# Patient Record
Sex: MALE | Race: WHITE
[De-identification: names, ages, dates, MRNs, and addresses within clinical notes are randomized per-mention and may not be internally consistent; named-entity substitution may affect disease eponyms.]

---

## 2018-04-03 ENCOUNTER — HOSPITAL ENCOUNTER (OUTPATIENT)
Dept: HOSPITAL 42 - ED | Age: 43
Setting detail: OBSERVATION
LOS: 1 days | Discharge: HOME | End: 2018-04-04
Attending: INTERNAL MEDICINE | Admitting: INTERNAL MEDICINE
Payer: COMMERCIAL

## 2018-04-03 VITALS — RESPIRATION RATE: 20 BRPM

## 2018-04-03 DIAGNOSIS — R07.89: Primary | ICD-10-CM

## 2018-04-03 DIAGNOSIS — E78.00: ICD-10-CM

## 2018-04-03 DIAGNOSIS — F17.210: ICD-10-CM

## 2018-04-03 DIAGNOSIS — E11.9: ICD-10-CM

## 2018-04-03 LAB
ALBUMIN SERPL-MCNC: 4.7 G/DL (ref 3–4.8)
ALBUMIN/GLOB SERPL: 1.4 {RATIO} (ref 1.1–1.8)
ALT SERPL-CCNC: 79 U/L (ref 7–56)
APTT BLD: 28.2 SECONDS (ref 25.1–36.5)
AST SERPL-CCNC: 55 U/L (ref 17–59)
BASOPHILS # BLD AUTO: 0.01 K/MM3 (ref 0–2)
BASOPHILS NFR BLD: 0.2 % (ref 0–3)
BNP SERPL-MCNC: < 11.1 PG/ML (ref 0–450)
BUN SERPL-MCNC: 11 MG/DL (ref 7–21)
CALCIUM SERPL-MCNC: 10.1 MG/DL (ref 8.4–10.5)
D DIMER PPP FEU-MCNC: < 200 NG/ML (ref 0–243)
EOSINOPHIL # BLD: 0.1 10*3/UL (ref 0–0.7)
EOSINOPHIL NFR BLD: 2.1 % (ref 1.5–5)
ERYTHROCYTE [DISTWIDTH] IN BLOOD BY AUTOMATED COUNT: 13.4 % (ref 11.5–14.5)
GFR NON-AFRICAN AMERICAN: > 60
GRANULOCYTES # BLD: 1.95 10*3/UL (ref 1.4–6.5)
GRANULOCYTES NFR BLD: 41.5 % (ref 50–68)
HDLC SERPL-MCNC: 39 MG/DL (ref 29–60)
HGB BLD-MCNC: 15.7 G/DL (ref 14–18)
INR PPP: 1.05 (ref 0.93–1.08)
LDLC SERPL-MCNC: 160 MG/DL (ref 0–129)
LYMPHOCYTES # BLD: 2.3 10*3/UL (ref 1.2–3.4)
LYMPHOCYTES NFR BLD AUTO: 49 % (ref 22–35)
MCH RBC QN AUTO: 29.5 PG (ref 25–35)
MCHC RBC AUTO-ENTMCNC: 34.2 G/DL (ref 31–37)
MCV RBC AUTO: 86.1 FL (ref 80–105)
MONOCYTES # BLD AUTO: 0.3 10*3/UL (ref 0.1–0.6)
MONOCYTES NFR BLD: 7.2 % (ref 1–6)
PLATELET # BLD: 217 10^3/UL (ref 120–450)
PMV BLD AUTO: 10 FL (ref 7–11)
PROTHROMBIN TIME: 12 SECONDS (ref 9.4–12.5)
RBC # BLD AUTO: 5.33 10^6/UL (ref 3.5–6.1)
TROPONIN I SERPL-MCNC: < 0.01 NG/ML
WBC # BLD AUTO: 4.7 10^3/UL (ref 4.5–11)

## 2018-04-03 PROCEDURE — 82550 ASSAY OF CK (CPK): CPT

## 2018-04-03 PROCEDURE — 93970 EXTREMITY STUDY: CPT

## 2018-04-03 PROCEDURE — 85730 THROMBOPLASTIN TIME PARTIAL: CPT

## 2018-04-03 PROCEDURE — 80061 LIPID PANEL: CPT

## 2018-04-03 PROCEDURE — 84443 ASSAY THYROID STIM HORMONE: CPT

## 2018-04-03 PROCEDURE — 82948 REAGENT STRIP/BLOOD GLUCOSE: CPT

## 2018-04-03 PROCEDURE — 93306 TTE W/DOPPLER COMPLETE: CPT

## 2018-04-03 PROCEDURE — 80053 COMPREHEN METABOLIC PANEL: CPT

## 2018-04-03 PROCEDURE — 85378 FIBRIN DEGRADE SEMIQUANT: CPT

## 2018-04-03 PROCEDURE — 83615 LACTATE (LD) (LDH) ENZYME: CPT

## 2018-04-03 PROCEDURE — 85610 PROTHROMBIN TIME: CPT

## 2018-04-03 PROCEDURE — 83880 ASSAY OF NATRIURETIC PEPTIDE: CPT

## 2018-04-03 PROCEDURE — 36415 COLL VENOUS BLD VENIPUNCTURE: CPT

## 2018-04-03 PROCEDURE — 99285 EMERGENCY DEPT VISIT HI MDM: CPT

## 2018-04-03 PROCEDURE — 85025 COMPLETE CBC W/AUTO DIFF WBC: CPT

## 2018-04-03 PROCEDURE — 83036 HEMOGLOBIN GLYCOSYLATED A1C: CPT

## 2018-04-03 PROCEDURE — 71045 X-RAY EXAM CHEST 1 VIEW: CPT

## 2018-04-03 PROCEDURE — 84484 ASSAY OF TROPONIN QUANT: CPT

## 2018-04-03 PROCEDURE — 93005 ELECTROCARDIOGRAM TRACING: CPT

## 2018-04-03 RX ADMIN — INSULIN HUMAN SCH: 100 INJECTION, SOLUTION PARENTERAL at 22:11

## 2018-04-03 RX ADMIN — INSULIN HUMAN SCH: 100 INJECTION, SOLUTION PARENTERAL at 16:42

## 2018-04-03 NOTE — CARD
--------------- APPROVED REPORT --------------





EKG Measurement

Heart [id number]

WY 170P30

[id number]

[id number]

[id number]



<Conclusion>

Normal sinus rhythm

Normal ECG

## 2018-04-03 NOTE — CP.PCM.HP
<Pedro Hurd - Last Filed: 04/03/18 15:24>





History of Present Illness





- History of Present Illness


History of Present Illness: 


Medicine H/P: Dr. Feliciano





Chief Complaint: Chest pain





HPI:


42 year old male with past medical history of diabetes and tobacco abuse 

presents with one month duration of chest pain.  Patient states that the pain 

occurs on and off and certain positions do make it worse.  Patient denies any 

shortness of breath with the pain, but states that he does get short of breath 

from time to time and is not able to walk long distances.  Patient came into 

the ER today because he experienced diaphoresis with the pain and said "i felt 

like i was turning yellow."  





Patient denies any previous cardiac work up, including ECHO, Stress, and Cath.  

Patient further denies any nausea, vomiting, diarrhea, and any chest pain in 

the past.  Patient denies recent illness, fevers, and chills.





PMD:       Dr. Paredes


Surgical History:    Patient denies


Medical History:    Diabetes, Back pain


Allergies:       Patient denies


Social History:    Smokes 1 cigarette a day, denies alcohol and illicits, 

reliable historian


Family History:    Denies any cardiac history; Diabetes


Medications:       Metformin 500 daily; Ibuprofen 800 for back pain


Review of Systems:   12 point ROS obtained and negative except as per HPI





Present on Admission





- Present on Admission


Any Indicators Present on Admission: No





Past Patient History





- Past Social History


Smoking Status: Current Some Days Smoker





- CARDIAC


Hx Cardiac Disorders: No





- PULMONARY


Hx Respiratory Disorders: No





- NEUROLOGICAL


Hx Neurological Disorder: No





- HEENT


Hx HEENT Problems: No





- RENAL


Hx Chronic Kidney Disease: No





- ENDOCRINE/METABOLIC


Hx Endocrine Disorders: Yes


Hx Diabetes Mellitus Type 2: Yes





- HEMATOLOGICAL/ONCOLOGICAL


Hx Blood Disorders: No





- INTEGUMENTARY


Hx Dermatological Problems: No





- MUSCULOSKELETAL/RHEUMATOLOGICAL


Hx Musculoskeletal Disorders: No





- GASTROINTESTINAL


Hx Gastrointestinal Disorders: No





- GENITOURINARY/GYNECOLOGICAL


Hx Genitourinary Disorders: No





- PSYCHIATRIC


Hx Psychophysiologic Disorder: No


Hx Substance Use: No





- SURGICAL HISTORY


Hx Surgeries: No





Meds


Allergies/Adverse Reactions: 


 Allergies











Allergy/AdvReac Type Severity Reaction Status Date / Time


 


No Known Allergies Allergy   Verified 04/03/18 12:00














Physical Exam





- Constitutional


Appears: Well





- Head Exam


Head Exam: ATRAUMATIC, NORMAL INSPECTION, NORMOCEPHALIC





- Eye Exam


Eye Exam: EOMI, Normal appearance, PERRL


Pupil Exam: NORMAL ACCOMODATION, PERRL





- ENT Exam


ENT Exam: Mucous Membranes Moist, Normal Exam





- Neck Exam


Neck exam: Positive for: Normal Inspection





- Respiratory Exam


Respiratory Exam: Clear to Auscultation Bilateral, NORMAL BREATHING PATTERN





- Cardiovascular Exam


Cardiovascular Exam: REGULAR RHYTHM


Additional comments: 


Tenderness to palpation in Left chest wall





- GI/Abdominal Exam


GI & Abdominal Exam: Normal Bowel Sounds, Soft.  absent: Tenderness





- Extremities Exam


Extremities exam: Positive for: normal inspection





- Back Exam


Back exam: NORMAL INSPECTION





- Neurological Exam


Neurological exam: Alert, CN II-XII Intact, Normal Gait, Oriented x3, Reflexes 

Normal





- Psychiatric Exam


Psychiatric exam: Normal Affect, Normal Mood





- Skin


Skin Exam: Dry, Intact, Normal Color, Warm





Results





- Vital Signs


Recent Vital Signs: 





 Last Vital Signs











Temp  98.8 F   04/03/18 12:01


 


Pulse  71   04/03/18 15:21


 


Resp  18   04/03/18 15:21


 


BP  132/79   04/03/18 15:21


 


Pulse Ox  98   04/03/18 15:21














- Labs


Result Diagrams: 


 04/03/18 13:00





 04/03/18 13:00





Assessment & Plan





- Assessment and Plan (Free Text)


Assessment: 


Assessment and Plan





42 year old male with past medical history of diabetes and back pain presenting 

with one month duration of left sided typical chest pain, not associated with 

any symptoms.  ASCVD 16.2% 10 year.





Chest pain rule out ACS, likely musculoskeletal


- Tenderness to palpation on left chest wall; EKG showed NSR with no ST/T 

changes; Chest XR negative


- Tropes X 3, ECHO, Coags pending, Trope X 1 negative 


- Dr. Paredes on consult


- Lipid panel showed elevated cholesterol and LDL


- Lipitor 20





Diabetes


- TSH, A1C ordered


- RISS Low





Calf tenderness


- Duplex, D-dimer 





Elevated ALT


- No clinical signs of transaminitis


- Monitor





GI/DVT Prophylaxis


- Heparin/Protonix





<Rangasamy,Ajantha - Last Filed: 04/03/18 19:07>





Results





- Vital Signs


Recent Vital Signs: 





 Last Vital Signs











Temp  98.3 F   04/03/18 18:00


 


Pulse  74   04/03/18 18:00


 


Resp  20   04/03/18 18:00


 


BP  98/67 L  04/03/18 18:00


 


Pulse Ox  99   04/03/18 18:00














- Labs


Result Diagrams: 


 04/03/18 13:00





 04/03/18 13:00





Attending/Attestation





- Attestation


I have personally seen and examined this patient.: Yes


I have fully participated in the care of the patient.: Yes


I have reviewed all pertinent clinical information: Yes


Notes (Text): 





04/03/18 19:05





attending note;





Patient seen and examined with resident.





Patient is a 42 year old male with past medical history of diabetes and tobacco 

abuse  is admitted with chest pain.


patient had nonspecific chest pain on and off.


EKG showed normal sinus rhythm with no acute ST-T changes.


Troponin 1 negative.


Currently complaining of exertional dyspnea.


Complaining of lower extremity discomfort. Doppler is negative.





diabetes; continue regular Insulin sliding scale.





active smoking; Smoking cessation is strongly advised.





Admit to telemetry. Cardiac enzymes 3 ordered. Echocardiogram requested.


Cardiology evaluation requested.





Upon discharge the patient will follow-up with PMD Dr. Paredes.

## 2018-04-03 NOTE — US
HISTORY:

Leg pain and swelling. Evaluate for DVT



PHYSICIAN(S):  Wellington Murdock MD.



TECHNIQUE:

Duplex sonography and color-flow Doppler with graded compression were 

used to evaluate the deep venous systems of both lower extremities. 



FINDINGS:

The visualized deep venous systems of both lower extremities are 

sonographically normal and compressible. Normal wave forms and 

augmentation are seen. There is no sonographic evidence for deep 

venous thrombosis in the visualized segments of both lower 

extremities.



IMPRESSION:

No sonographic evidence for deep venous thrombosis in the visualized 

segments of both lower extremities.

## 2018-04-03 NOTE — RAD
HISTORY:

cp  



COMPARISON:

No prior. 



FINDINGS:



LUNGS:

No active pulmonary disease.



PLEURA:

No significant pleural effusion identified, no pneumothorax apparent.



CARDIOVASCULAR:

Normal.



OSSEOUS STRUCTURES:

No significant abnormalities.



VISUALIZED UPPER ABDOMEN:

Normal.



OTHER FINDINGS:

None.



IMPRESSION:

No acute cardiopulmonary disease appreciated.

## 2018-04-03 NOTE — ED PDOC
Arrival/HPI





- General


Chief Complaint: Weakness/Neurological Deficit


Time Seen by Provider: 04/03/18 12:11


Historian: Patient





- History of Present Illness


Narrative History of Present Illness (Text): 


04/03/18 12:30


A 42 year old male, whose past medical history includes diabetes, presents to 

the emergency department complaining of intermittent chest pain for the past 

couple months. Patient reports chest pain with dizziness and diaphoresis over 

the last couple days. Patient is a smoker, denies drinking alcohol. Patient 

denies any shortness of breath, nausea, vomiting or any other complaints at 

this time.





Time/Duration: < week, > month


Symptom Onset: Sudden


Symptom Course: Unchanged


Activities at Onset: Rest


Context: Home


Associated Symptoms (Text): 





04/03/18 12:35


Intermittent left-sided chest pain for the last several months. No radiation. 

Over the last few days he's also had dizziness and diaphoresis associated with 

the chest pain. He is a smoker and diabetic.





Past Medical History





- Provider Review


Nursing Documentation Reviewed: Yes





- Cardiac


Hx Cardiac Disorders: No





- Pulmonary


Hx Respiratory Disorders: No





- Neurological


Hx Neurological Disorder: No





- HEENT


Hx HEENT Disorder: No





- Renal


Hx Renal Disorder: No





- Endocrine/Metabolic


Hx Endocrine Disorders: Yes


Hx Diabetes Mellitus Type 2: Yes





- Hematological/Oncological


Hx Blood Disorders: No





- Integumentary


Hx Dermatological Disorder: No





- Musculoskeletal/Rheumatological


Hx Musculoskeletal Disorders: No





- Gastrointestinal


Hx Gastrointestinal Disorders: No





- Genitourinary/Gynecological


Hx Genitourinary Disorders: No





- Psychiatric


Hx Psychophysiologic Disorder: No


Hx Substance Use: No





Family/Social History





- Physician Review


Nursing Documentation Reviewed: Yes


Family/Social History: No Known Family HX


Smoking Status: Current Some Days Smoker


Hx Alcohol Use: No


Hx Substance Use: No





Allergies/Home Meds


Allergies/Adverse Reactions: 


Allergies





No Known Allergies Allergy (Verified 04/03/18 12:00)


 








Home Medications: 


 Home Meds











 Medication  Instructions  Recorded  Confirmed


 


metFORMIN [glucOPHAGE] 500 mg PO DAILY 04/03/18 04/03/18














Review of Systems





- Physician Review


All systems were reviewed & negative as marked: Yes





- Review of Systems


Constitutional: absent: Fatigue, Fevers


Respiratory: absent: SOB


Cardiovascular: Chest Pain.  absent: Palpitations, Syncope


Gastrointestinal: absent: Abdominal Pain, Nausea, Vomiting


Neurological: Dizziness.  absent: Headache, Focal Weakness


Endocrine: Diaphoresis





Physical Exam


Vital Signs Reviewed: Yes


Vital Signs











  Temp Pulse Resp BP Pulse Ox


 


 04/03/18 12:23   76  20  137/86  98


 


 04/03/18 12:01  98.8 F  72  16  139/82  95











Temperature: Afebrile


Blood Pressure: Normal


Pulse: Regular


Respiratory Rate: Normal


Appearance: Positive for: Well-Appearing, Non-Toxic, Comfortable


Pain Distress: None


Mental Status: Positive for: Alert and Oriented X 3





- Systems Exam


Head: Present: Atraumatic, Normocephalic


Pupils: Present: PERRL


Extroacular Muscles: Present: EOMI


Conjunctiva: Present: Normal


Mouth: Present: Moist Mucous Membranes


Pharnyx: No: ERYTHEMA, EXUDATE, TONSILS ENLARGED


Neck: Present: Normal Range of Motion


Respiratory/Chest: Present: Clear to Auscultation, Good Air Exchange.  No: 

Respiratory Distress, Accessory Muscle Use, Tender to Palpation


Cardiovascular: Present: Regular Rate and Rhythm, Normal S1, S2.  No: Murmurs


Abdomen: No: Tenderness, Distention, Peritoneal Signs


Back: Present: Normal Inspection


Upper Extremity: Present: Normal Inspection.  No: Cyanosis, Edema


Lower Extremity: Present: Normal Inspection.  No: Edema


Neurological: Present: GCS=15, CN II-XII Intact, Speech Normal, Motor Func 

Grossly Intact


Skin: Present: Warm, Dry, Normal Color.  No: Rashes


Psychiatric: Present: Alert, Oriented x 3, Normal Insight, Normal Concentration





Medical Decision Making


ED Course and Treatment: 


04/03/18 12:29


Impression:


A 42 year old male with chest pain, dizziness and diaphoresis.





Plan:


-- EKG


-- Chest X-ray


-- labs


-- Aspirin


-- Reassess and disposition





Progress Notes:





04/03/18 12:36


EKG shows normal sinus rhythm rate approximately 72 with no acute ST or T-wave 

changes





04/03/18 13:14


Chest X-ray


Creator : Kameron Branham MD 


IMPRESSION:


No acute cardiopulmonary disease appreciated.








- Lab Interpretations


Lab Results: 








 04/03/18 13:00 





 04/03/18 13:00 





 Lab Results





04/03/18 13:00: Sodium 146, Potassium 3.7, Chloride 104, Carbon Dioxide 28, 

Anion Gap 17, BUN 11, Creatinine 0.9, Est GFR (African Amer) > 60, Est GFR (Non-

Af Amer) > 60, Random Glucose 97, Calcium 10.1, Total Bilirubin 1.0, AST 55, 

ALT 79 H, Alkaline Phosphatase 68, Lactate Dehydrogenase 564, Total Creatine 

Kinase 224, Troponin I < 0.01, NT-Pro-B Natriuret Pep < 11.1, Total Protein 8.2

, Albumin 4.7, Globulin 3.4, Albumin/Globulin Ratio 1.4


04/03/18 13:00: WBC 4.7, RBC 5.33, Hgb 15.7, Hct 45.9, MCV 86.1, MCH 29.5, MCHC 

34.2, RDW 13.4, Plt Count 217, MPV 10.0, Gran % 41.5 L, Lymph % (Auto) 49.0 H, 

Mono % (Auto) 7.2 H, Eos % (Auto) 2.1, Baso % (Auto) 0.2, Gran # 1.95, Lymph # (

Auto) 2.3, Mono # (Auto) 0.3, Eos # (Auto) 0.1, Baso # (Auto) 0.01








I have reviewed the lab results: Yes





- RAD Interpretation


Radiology Orders: 








04/03/18 12:23


CHEST PORTABLE [RAD] Stat 














- EKG Interpretation


Interpreted by ED Physician: Yes


Type: 12 lead EKG





- Medication Orders


Current Medication Orders: 











Discontinued Medications





Aspirin (Ecotrin)  325 mg PO STAT STA


   Stop: 04/03/18 12:23


   Last Admin: 04/03/18 12:57  Dose: 325 mg











- Scribe Statement


The provider has reviewed the documentation as recorded by the Jennifer Villalobos





Provider Scribe Attestation:


All medical record entries made by the Jennifer were at my direction and 

personally dictated by me. I have reviewed the chart and agree that the record 

accurately reflects my personal performance of the history, physical exam, 

medical decision making, and the department course for this patient. I have 

also personally directed, reviewed, and agree with the discharge instructions 

and disposition.











Disposition/Present on Arrival





- Present on Arrival


Any Indicators Present on Arrival: No


History of DVT/PE: No


History of Uncontrolled Diabetes: No


Urinary Catheter: No


History of Decub. Ulcer: No


History Surgical Site Infection Following: None





- Disposition


Have Diagnosis and Disposition been Completed?: Yes


Diagnosis: 


 Chest pain





Disposition: HOSPITALIZED


Disposition Time: 13:39


Patient Plan: Observation, Telemetry


Condition: GOOD


Discharge Instructions (ExitCare):  Chest Pain (ED)


Referrals: 


Cris Paredes MD [Primary Care Provider] - Follow up with primary


Forms:  View2Gether (English)

## 2018-04-04 VITALS — SYSTOLIC BLOOD PRESSURE: 111 MMHG | DIASTOLIC BLOOD PRESSURE: 75 MMHG | TEMPERATURE: 98.3 F

## 2018-04-04 VITALS — HEART RATE: 93 BPM

## 2018-04-04 VITALS — OXYGEN SATURATION: 100 %

## 2018-04-04 RX ADMIN — INSULIN HUMAN SCH: 100 INJECTION, SOLUTION PARENTERAL at 07:38

## 2018-04-04 RX ADMIN — INSULIN HUMAN SCH: 100 INJECTION, SOLUTION PARENTERAL at 12:36

## 2018-04-04 NOTE — CON
DATE:



CARDIOLOGY CONSULT



HISTORY OF PRESENT ILLNESS:  The patient is 42-year-old [place] man who

was diagnosed with diabetes mellitus one year ago, has no known prior

cardiac history, who presents because of chest pain that he describes as

pinching in the chest after he had an argument.  The patient is unaware of

any prior cardiac history.  He denies any chest pain at this time.  He

denies any associated shortness of breath or diaphoresis with his initial

presentation.



SOCIAL HISTORY:  The patient is occasional smoker, he works as a taxi

.



MEDICATIONS:  Current medications are subcutaneous heparin 5000 units every

8 hours, Lipitor 20 mg once a day, Protonix 40 mg once day, and metformin

500 mg orally daily at home.



REVIEW OF SYSTEMS:  No fever or chills, no vomiting, no diarrhea.



PHYSICAL EXAMINATION:

GENERAL:  The patient is middle-aged male who does not appear to be in any

distress.

VITAL SIGNS:  Blood pressure 143/71, heart rate 71, temperature 99,

respirations 20.

HEENT:  Normocephalic.

NECK:  No JVD.

CHEST:  Clear.

HEART:  S1 and S2, regular.

ABDOMEN:  Soft.

EXTREMITIES:  No edema.



LABORATORY DATA:  Three sets of troponin's are negative.  SMA-7 is within

normal limits.  Total cholesterol is 238 and LDL cholesterol is 160, both

are significantly elevated.  PT, PTT, D-dimer are within normal limits. 

Hemoglobin, hematocrit, white count, and platelet count are within normal

limits.  An EKG revealed normal sinus rhythm.



ASSESSMENT:

1.  Atypical chest pain, myocardial infarction is ruled out.

2.  History of diabetes mellitus.



RECOMMENDATIONS:  I did review the reports of the venous Doppler of the

lower extremity as well as the chest x-ray, both were unremarkable.  I will

review the echocardiography study that was performed today and if

unremarkable, the patient can be discharged to be followed by his primary

physician for an outpatient stress test.  The patient was fully informed

about the need for outpatient stress test.







__________________________________________

Hossein Ford MD



DD:  04/04/2018 11:33:34

DT:  04/04/2018 14:14:39

Job # [id number]

## 2018-04-04 NOTE — CARD
--------------- APPROVED REPORT --------------





EXAM: Two-dimensional and M-mode echocardiogram with Doppler and 

color Doppler.



INDICATION

Chest Pain 



2D DIMENSIONS 

Left Atrium (2D)3.7   (1.6-4.0cm)IVSd0.9   (0.7-1.1cm)

LVDd3.9   (3.9-5.9cm)PWd1.2   (0.7-1.1cm)

LVDs2.8   (2.5-4.0cm)FS (%) 29.5   %

LVEF (%)57.1   (>50%)



M-Mode DIMENSIONS 

Aortic Root2.40   (2.2-3.7cm)Aortic Cusp Exc.1.50   (1.5-2.0cm)



Aortic Valve

AoV Peak Wishtgwl859.0cm/Dean Peak GR.8mmHg



Mitral Valve

MV E Sotrrlkb69.0cm/sMV A Tcqlfhmy18.0cm/sE/A ratio1.0



TDI

E/Lateral E'0.0E/Medial E'0.0



Tricuspid Valve

TR Peak Pszajqbm186uq/sRAP UTXTHRDH53gxZaAA Peak Gr.16mmHg

[id number]



 LEFT VENTRICLE 

The left ventricle is normal size. There is normal left ventricular 

wall thickness. The left ventricular function is normal. The left 

ventricular ejection fraction is within the normal range. There is 

normal LV segmental wall motion. Transmitral Doppler flow pattern is 

Grade I-abnormal relaxation pattern.



 RIGHT VENTRICLE 

The right ventricle is normal size. There is normal right ventricular 

wall thickness. The right ventricular systolic function is normal.



 ATRIA 

The left atrium size is normal. The right atrium size is normal.



 AORTIC VALVE 

The aortic valve is normal in structure. No aortic regurgitation is 

present. There is no aortic valvular stenosis. 



 MITRAL VALVE 

The mitral valve is normal in structure. Mitral regurgitation is 

trace. There is no mitral valve stenosis.



 TRICUSPID VALVE 

The tricuspid valve is normal in structure. There is no tricuspid 

valve regurgitation noted.



 GREAT VESSELS 

The aortic root is normal in size. The IVC is normal in size and 

collapses >50% with inspiration.



 PERICARDIAL EFFUSION 

There is no pericardial effusion.



<Conclusion>

The left ventricle is normal size.

There is normal left ventricular wall thickness.

The left ventricular function is normal.

The left ventricular ejection fraction is within the normal range.

There is normal LV segmental wall motion.

Transmitral Doppler flow pattern is Grade I-abnormal relaxation 

pattern.

## 2018-04-04 NOTE — CP.PCM.DIS
Provider





- Provider


Date of Admission: 


04/03/18 13:38





Attending physician: 


Lenny Feliciano MD





Primary care physician: 


Cris Paredes MD








Hospital Course





- Lab Results


Lab Results: 


 Most Recent Lab Values











WBC  4.7 10^3/ul (4.5-11.0)   04/03/18  13:00    


 


RBC  5.33 10^6/uL (3.5-6.1)   04/03/18  13:00    


 


Hgb  15.7 g/dL (14.0-18.0)   04/03/18  13:00    


 


Hct  45.9 % (42.0-52.0)   04/03/18  13:00    


 


MCV  86.1 fl (80.0-105.0)   04/03/18  13:00    


 


MCH  29.5 pg (25.0-35.0)   04/03/18  13:00    


 


MCHC  34.2 g/dl (31.0-37.0)   04/03/18  13:00    


 


RDW  13.4 % (11.5-14.5)   04/03/18  13:00    


 


Plt Count  217 10^3/uL (120.0-450.0)   04/03/18  13:00    


 


MPV  10.0 fl (7.0-11.0)   04/03/18  13:00    


 


Gran %  41.5 % (50.0-68.0)  L  04/03/18  13:00    


 


Lymph % (Auto)  49.0 % (22.0-35.0)  H  04/03/18  13:00    


 


Mono % (Auto)  7.2 % (1.0-6.0)  H  04/03/18  13:00    


 


Eos % (Auto)  2.1 % (1.5-5.0)   04/03/18  13:00    


 


Baso % (Auto)  0.2 % (0.0-3.0)   04/03/18  13:00    


 


Gran #  1.95  (1.4-6.5)   04/03/18  13:00    


 


Lymph # (Auto)  2.3  (1.2-3.4)   04/03/18  13:00    


 


Mono # (Auto)  0.3  (0.1-0.6)   04/03/18  13:00    


 


Eos # (Auto)  0.1  (0.0-0.7)   04/03/18  13:00    


 


Baso # (Auto)  0.01 K/mm3 (0.0-2.0)   04/03/18  13:00    


 


PT  12.0 SECONDS (9.4-12.5)   04/03/18  20:10    


 


INR  1.05  (0.93-1.08)   04/03/18  20:10    


 


APTT  28.2 Seconds (25.1-36.5)   04/03/18  20:10    


 


D-Dimer, Quantitative  < 200 ng/mL (0-243)   04/03/18  20:10    


 


Sodium  146 mmol/L (132-148)   04/03/18  13:00    


 


Potassium  3.7 mmol/L (3.6-5.0)   04/03/18  13:00    


 


Chloride  104 mmol/L ()   04/03/18  13:00    


 


Carbon Dioxide  28 mmol/L (21-33)   04/03/18  13:00    


 


Anion Gap  17  (10-20)   04/03/18  13:00    


 


BUN  11 mg/dL (7-21)   04/03/18  13:00    


 


Creatinine  0.9 mg/dl (0.8-1.5)   04/03/18  13:00    


 


Est GFR ( Amer)  > 60   04/03/18  13:00    


 


Est GFR (Non-Af Amer)  > 60   04/03/18  13:00    


 


POC Glucose (mg/dL)  123 mg/dL ()  H  04/04/18  12:14    


 


Random Glucose  97 mg/dL ()   04/03/18  13:00    


 


Hemoglobin A1c  6.1 % (4.2-6.5)   04/03/18  20:10    


 


Calcium  10.1 mg/dL (8.4-10.5)   04/03/18  13:00    


 


Total Bilirubin  1.0 mg/dL (0.2-1.3)   04/03/18  13:00    


 


AST  55 U/L (17-59)   04/03/18  13:00    


 


ALT  79 U/L (7-56)  H  04/03/18  13:00    


 


Alkaline Phosphatase  68 U/L ()   04/03/18  13:00    


 


Lactate Dehydrogenase  564 U/L (333-699)   04/03/18  13:00    


 


Total Creatine Kinase  224 U/L ()   04/03/18  13:00    


 


Troponin I  < 0.01 ng/mL  04/04/18  02:05    


 


NT-Pro-B Natriuret Pep  < 11.1 pg/mL (0-450)   04/03/18  13:00    


 


Total Protein  8.2 g/dL (5.8-8.3)   04/03/18  13:00    


 


Albumin  4.7 g/dL (3.0-4.8)   04/03/18  13:00    


 


Globulin  3.4 gm/dL  04/03/18  13:00    


 


Albumin/Globulin Ratio  1.4  (1.1-1.8)   04/03/18  13:00    


 


Triglycerides  103 mg/dL ()   04/03/18  13:00    


 


Cholesterol  238 mg/dL (130-200)  H  04/03/18  13:00    


 


LDL Cholesterol Direct  160 mg/dL (0-129)  H  04/03/18  13:00    


 


HDL Cholesterol  39 mg/dL (29-60)   04/03/18  13:00    


 


TSH 3rd Generation  0.99 mIU/mL (0.46-4.68)   04/03/18  20:10    














Discharge Exam





- Head Exam


Head Exam: ATRAUMATIC, NORMAL INSPECTION, NORMOCEPHALIC





Discharge Plan





- Discharge Medications


Prescriptions: 


Atorvastatin [Lipitor] 20 mg PO DIN #30 tab





- Follow Up Plan


Condition: GOOD


Disposition: HOME/ ROUTINE


Instructions:  Chest Pain (DC), Chest Pain (GEN)


Additional Instructions: 


1. Please follow up with Dr. Paredes outpatient


2. Please ask Dr. Paredes to set you up with a heart doctor (cardiologist)


3. You should get a stress test done once you establish care with a cardiologist


Referrals: 


Cris Paredes MD [Primary Care Provider] -

## 2018-04-05 NOTE — CARD
--------------- APPROVED REPORT --------------





EKG Measurement

Heart [id number]

DE 170P16

[id number]

[id number]

[id number]



<Conclusion>

Normal sinus rhythm

Normal ECG